# Patient Record
Sex: FEMALE | Race: OTHER | HISPANIC OR LATINO
[De-identification: names, ages, dates, MRNs, and addresses within clinical notes are randomized per-mention and may not be internally consistent; named-entity substitution may affect disease eponyms.]

---

## 2020-02-05 ENCOUNTER — APPOINTMENT (OUTPATIENT)
Dept: UROLOGY | Facility: CLINIC | Age: 40
End: 2020-02-05
Payer: MEDICAID

## 2020-02-05 VITALS
SYSTOLIC BLOOD PRESSURE: 111 MMHG | OXYGEN SATURATION: 100 % | DIASTOLIC BLOOD PRESSURE: 71 MMHG | HEIGHT: 66 IN | HEART RATE: 71 BPM

## 2020-02-05 DIAGNOSIS — R10.2 PELVIC AND PERINEAL PAIN: ICD-10-CM

## 2020-02-05 DIAGNOSIS — Z80.42 FAMILY HISTORY OF MALIGNANT NEOPLASM OF PROSTATE: ICD-10-CM

## 2020-02-05 DIAGNOSIS — R31.29 OTHER MICROSCOPIC HEMATURIA: ICD-10-CM

## 2020-02-05 PROCEDURE — 99204 OFFICE O/P NEW MOD 45 MIN: CPT | Mod: 25

## 2020-02-05 PROCEDURE — 51798 US URINE CAPACITY MEASURE: CPT

## 2020-02-05 NOTE — REVIEW OF SYSTEMS
[Recent Weight Gain (___ Lbs)] : recent [unfilled] ~Ulb weight gain [Dry Eyes] : dryness of the eyes [Vomiting] : vomiting [Urine Infection (bladder/kidney)] : bladder/kidney infection [Pain during urination] : pain during urination [Blood in urine that you can see] : blood visible in urine [Negative] : Heme/Lymph [Fever] : no fever [Feeling Poorly] : not feeling poorly [Chills] : no chills [Abdominal Pain] : no abdominal pain [Constipation] : no constipation [Feeling Tired] : not feeling tired [Diarrhea] : no diarrhea [Heartburn] : no heartburn

## 2020-02-05 NOTE — ASSESSMENT
[FreeTextEntry1] : micorhematuria and blood on tissue with wiping\par will get MARIYA and check urine today \par rtc for CYSTO and vag exam \par ? related to Mirena

## 2020-02-05 NOTE — PHYSICAL EXAM
[General Appearance - Well Developed] : well developed [Normal Appearance] : normal appearance [Well Groomed] : well groomed [General Appearance - Well Nourished] : well nourished [General Appearance - In No Acute Distress] : no acute distress [Edema] : no peripheral edema [Respiration, Rhythm And Depth] : normal respiratory rhythm and effort [Abdomen Tenderness] : non-tender [Exaggerated Use Of Accessory Muscles For Inspiration] : no accessory muscle use [Abdomen Soft] : soft [Abdomen Hernia] : no hernia was discovered [Abdomen Mass (___ Cm)] : no abdominal mass palpated [Costovertebral Angle Tenderness] : no ~M costovertebral angle tenderness [FreeTextEntry1] : pvr 24 ml  [Normal Station and Gait] : the gait and station were normal for the patient's age [] : no rash [Oriented To Time, Place, And Person] : oriented to person, place, and time [No Focal Deficits] : no focal deficits [Not Anxious] : not anxious [Mood] : the mood was normal [Affect] : the affect was normal [Cervical Lymph Nodes Enlarged Anterior Bilaterally] : anterior cervical [Cervical Lymph Nodes Enlarged Posterior Bilaterally] : posterior cervical [Supraclavicular Lymph Nodes Enlarged Bilaterally] : supraclavicular

## 2020-02-05 NOTE — HISTORY OF PRESENT ILLNESS
[FreeTextEntry1] : patient here from [place] one  month\par states had SP pressure/pain and microhematuria when in [place] oneyear and was evaluated and was negative by report\par  ( one vag and one csection ) , was treated with Monoril and had Mirena placed 3 years ago for 5 years\par denies any LUTS or bowel issues\par here for further eval \par non smokere an no hx of renal stones

## 2020-02-07 LAB
APPEARANCE: CLEAR
BACTERIA UR CULT: NORMAL
BACTERIA: NEGATIVE
BILIRUBIN URINE: NEGATIVE
BLOOD URINE: ABNORMAL
COLOR: NORMAL
GLUCOSE QUALITATIVE U: NEGATIVE
HYALINE CASTS: 1 /LPF
KETONES URINE: NEGATIVE
LEUKOCYTE ESTERASE URINE: ABNORMAL
MICROSCOPIC-UA: NORMAL
NITRITE URINE: NEGATIVE
PH URINE: 8
PROTEIN URINE: NEGATIVE
RED BLOOD CELLS URINE: 6 /HPF
SPECIFIC GRAVITY URINE: 1.01
SQUAMOUS EPITHELIAL CELLS: 0 /HPF
UROBILINOGEN URINE: NORMAL
WHITE BLOOD CELLS URINE: 42 /HPF

## 2020-03-02 ENCOUNTER — APPOINTMENT (OUTPATIENT)
Dept: UROLOGY | Facility: CLINIC | Age: 40
End: 2020-03-02

## 2021-05-19 ENCOUNTER — EMERGENCY (EMERGENCY)
Facility: HOSPITAL | Age: 41
LOS: 1 days | Discharge: HOME | End: 2021-05-19
Attending: EMERGENCY MEDICINE
Payer: MEDICAID

## 2021-05-19 VITALS
HEIGHT: 66 IN | OXYGEN SATURATION: 98 % | DIASTOLIC BLOOD PRESSURE: 83 MMHG | SYSTOLIC BLOOD PRESSURE: 131 MMHG | HEART RATE: 85 BPM | RESPIRATION RATE: 17 BRPM | TEMPERATURE: 98 F

## 2021-05-19 LAB
APPEARANCE UR: CLEAR — SIGNIFICANT CHANGE UP
BACTERIA # UR AUTO: ABNORMAL /HPF
BILIRUB UR-MCNC: NEGATIVE — SIGNIFICANT CHANGE UP
COLOR SPEC: YELLOW — SIGNIFICANT CHANGE UP
DIFF PNL FLD: ABNORMAL
EPI CELLS # UR: SIGNIFICANT CHANGE UP /HPF
GLUCOSE UR QL: NEGATIVE — SIGNIFICANT CHANGE UP
KETONES UR-MCNC: ABNORMAL
LEUKOCYTE ESTERASE UR-ACNC: ABNORMAL
NITRITE UR-MCNC: NEGATIVE — SIGNIFICANT CHANGE UP
PH UR: 6 — SIGNIFICANT CHANGE UP (ref 5–8)
PROT UR-MCNC: 100
RBC CASTS # UR COMP ASSIST: >50 /HPF (ref 0–2)
SP GR SPEC: 1.02 — SIGNIFICANT CHANGE UP (ref 1.01–1.02)
UROBILINOGEN FLD QL: NEGATIVE — SIGNIFICANT CHANGE UP
WBC UR QL: ABNORMAL /HPF (ref 0–5)

## 2021-05-19 PROCEDURE — 81001 URINALYSIS AUTO W/SCOPE: CPT

## 2021-05-19 PROCEDURE — 87086 URINE CULTURE/COLONY COUNT: CPT

## 2021-05-19 PROCEDURE — 99284 EMERGENCY DEPT VISIT MOD MDM: CPT

## 2021-05-19 PROCEDURE — 87186 SC STD MICRODIL/AGAR DIL: CPT

## 2021-05-19 PROCEDURE — 99283 EMERGENCY DEPT VISIT LOW MDM: CPT

## 2021-05-19 RX ORDER — CEPHALEXIN 500 MG
1 CAPSULE ORAL
Qty: 21 | Refills: 0
Start: 2021-05-19 | End: 2021-05-25

## 2021-05-19 NOTE — ED PROVIDER NOTE - PATIENT PORTAL LINK FT
You can access the FollowMyHealth Patient Portal offered by [facility] by registering at the following website: [email or web address]. By joining [employer]’s FollowMyHealth portal, you will also be able to view your health information using other applications (apps) compatible with our system.

## 2021-05-19 NOTE — ED PROVIDER NOTE - CPE EDP ENMT NORM
Telephone Encounter by Malgorzata Zamora NCMA at 01/19/18 12:07 PM     Author:  Malgorzata Zamora NCMA Service:  (none) Author Type:  Medical Assistant     Filed:  01/19/18 12:08 PM Encounter Date:  1/19/2018 Status:  Signed     :  Malgorzata Zamora NCMA (Medical Assistant)            RX sent to pharmacy, my chart to patient .[MN1.1M]       Revision History        User Key Date/Time User Provider Type Action    > MN1.1 01/19/18 12:08 PM Malgorzata Zamora NCMA Medical Assistant Sign    M - Manual             normal...

## 2021-05-19 NOTE — ED PROVIDER NOTE - NSFOLLOWUPINSTRUCTIONS_ED_ALL_ED_FT
IMPORTANT INSTRUCTIONS FROM Dr. MCGEE:    Please follow up with your personal medical doctor in 24-48 hours.      If you were advised to take any medications - be sure to review the package insert.    If your symptoms change, get worse or if you have any new symptoms, come to the ER right away.  If you have any questions, call the ER at the phone number on this page.

## 2021-05-19 NOTE — ED PROVIDER NOTE - OBJECTIVE STATEMENT
40 year old male female with PMHx of recent UTIs and no significant PSHx presents to the ED with complaints of burning on urination, now with blood in urine today. Patient denies any fevers, flank pain, vomiting, and all other acute complaints. Patient endorses that she has an appointment scheduled with her GYN for tomorrow, however states that she became concerned regarding the blood in her urine, and thus decided to visit the ED today. Patient reports that she has been tolerating po appropriately. NKDA

## 2021-05-19 NOTE — ED PROVIDER NOTE - CLINICAL SUMMARY MEDICAL DECISION MAKING FREE TEXT BOX
Discussed with patient that she needs to followup with her GYN regarding multiple urine infections for further evaluation. Will treat accordingly. Discussed with patient that she needs to followup with her GYN regarding multiple urine infections for further evaluation. Will treat accordingly. Not suspicious for kidney stone.

## 2022-03-23 ENCOUNTER — EMERGENCY (EMERGENCY)
Facility: HOSPITAL | Age: 42
LOS: 1 days | Discharge: HOME | End: 2022-03-23
Attending: EMERGENCY MEDICINE
Payer: MEDICAID

## 2022-03-23 VITALS
OXYGEN SATURATION: 100 % | TEMPERATURE: 98 F | HEIGHT: 66 IN | WEIGHT: 169.76 LBS | SYSTOLIC BLOOD PRESSURE: 130 MMHG | HEART RATE: 69 BPM | DIASTOLIC BLOOD PRESSURE: 80 MMHG | RESPIRATION RATE: 16 BRPM

## 2022-03-23 PROBLEM — N39.0 URINARY TRACT INFECTION, SITE NOT SPECIFIED: Chronic | Status: ACTIVE | Noted: 2021-05-24

## 2022-03-23 PROCEDURE — 73630 X-RAY EXAM OF FOOT: CPT

## 2022-03-23 PROCEDURE — 99284 EMERGENCY DEPT VISIT MOD MDM: CPT

## 2022-03-23 PROCEDURE — 99283 EMERGENCY DEPT VISIT LOW MDM: CPT | Mod: 25

## 2022-03-23 PROCEDURE — 73630 X-RAY EXAM OF FOOT: CPT | Mod: 26,RT

## 2022-03-23 RX ORDER — IBUPROFEN 200 MG
600 TABLET ORAL ONCE
Refills: 0 | Status: COMPLETED | OUTPATIENT
Start: 2022-03-23 | End: 2022-03-23

## 2022-03-23 RX ADMIN — Medication 600 MILLIGRAM(S): at 12:44

## 2022-03-23 NOTE — ED PROVIDER NOTE - NSFOLLOWUPINSTRUCTIONS_ED_ALL_ED_FT
Seguimiento con el podólogo dentro de los 4 días.    Puede mitchel motrin/tylenol según sea necesario para el dolor.    Si experimenta síntomas nuevos o que empeoran, o si está preocupado, siempre puede regresar a la emergencia para thomas reevaluación.    Fractura de un dedo del pie    LO QUE NECESITA SABER:    ¿Qué es thomas fractura de dedo del pie?Thomas fractura del dedo del pie es thomas rotura en [place] de los huesos del dedo del pie.    Anatomía del pie         ¿Cuáles son los signos y síntomas de thomas fractura en un dedo del pie?  •Dolor, enrojecimiento, inflamación o moretones      •No poder doblar o  el dedo del pie      •Incapacidad para caminar o apoyar peso en el dedo del pie      •El dedo del pie está doblado en un ángulo que no es normal      ¿Cómo se diagnostica thomas fractura en un dedo del pie?Sales médico lo examinará y le hará preguntas sobre la lesión. Es posible que usted necesite los siguientes exámenes:   •Thomas radiografíapodría mostrar la fractura del dedo.      •Thomas resonancia magnéticapuede mostrar thomas fractura de estrés o daño del ligamento. Es posible que le administren un líquido de contraste para que la lesión se galina mejor en las imágenes. Informe a sales médico si alguna vez ha tenido thomas reacción alérgica al medio de contraste. No entre a la luciano donde se realiza la resonancia magnética con algo de metal. El metal puede causar lesiones serias. Informe a sales médico si tiene algún metal dentro o sobre sales cuerpo.      ¿Cómo se trata thomas fractura en un dedo del pie?  •Thomas cinta que cubre dos dedos, un vendaje elástico o thomas férulapueden usarse para apoyar el dedo del pie en sales posición correcta. La cinta que cubre dos dedos se usa para unir el dedo del pie roto y el dedo del pie a sales lado.      •Un dispositivo de apoyo, incluyendo un bastón, muletas, thomas bota para caminar o un zapato de suela dura, puede ser necesario. Estos protegen el dedo del pie y limitan sales movimiento para que pueda sanar.  Bota para caminar           •Los medicamentospodrían administrarse para prevenir o tratar el dolor o thomas infección bacteriana.      •La reducción cerradase utiliza para volver a colocar los huesos en sales lugar sin cirugía.      •La cirugíapuede ser necesaria si el hueso está fuera de lugar o se daña la articulación del dedo del pie. Se pueden utilizar alambres, clavos u otro equipo ortopédico para mantener el hueso en sales lugar mientras camilo.      ¿Cómo puedo controlar los síntomas?  •Descanseel dedo para que pueda sanar. Regrese a joe actividades cotidianas según las indicaciones.      •Aplique hieloen el dedo del pie de 15 a 20 minutos cada hora o jolene se le indique. Use thomas compresa de hielo o ponga hielo triturado en thoams bolsa de plástico. Envuelva el hielo con thomas toalla antes de colocarlo sobre el dedo del pie. El hielo ayuda a evitar daño al tejido y a disminuir la inflamación y el dolor.      •Eleveel dedo por encima del nivel del corazón con la mayor frecuencia posible. [place] va a disminuir inflamación y el dolor. Coloque sales pie sobre almohadas o cobijas para mantenerla elevada y cómodamente.  Elevar la pierna           ¿Cuándo blessing buscar atención inmediata?  •La madhu empapa el vendaje.      •Usted tiene dolor severo en el dedo del pie.      •Sales dedo se encuentra frío o entumecido.      ¿Cuándo blessing llamar a mi médico?  •Tiene fiebre.      •El dolor no desaparece, incluso después del tratamiento.      •Después de que se haya sanado sales dedo, usted todavía siente dolor.      •Usted tiene preguntas o inquietudes acerca de sales condición o cuidado.      ACUERDOS SOBRE SALES CUIDADO:    Usted tiene el derecho de ayudar a planear sales cuidado. Aprenda todo lo que pueda sobre sales condición y jolene darle tratamiento. Discuta joe opciones de tratamiento con joe médicos para decidir el cuidado que usted desea recibir. Usted siempre tiene el derecho de rechazar el tratamiento.

## 2022-03-23 NOTE — ED PROVIDER NOTE - OBJECTIVE STATEMENT
41 y.o female with no PMHx is coming in after stubbing her great right toe in the bathroom yesterday. Patient reports right first toe pain and states she took no medication for her pain. 41 y.o female with no PMHx is coming in after stubbing her great right toe in the bathroom yesterday. Patient reports right first toe pain and states she took no medication for her pain. Denies numbness/tingling.

## 2022-03-23 NOTE — ED PROVIDER NOTE - ATTENDING CONTRIBUTION TO CARE
41yoF prev healthy presents with R big toe pain after stubbing it yesterday. Denies all other trauma and proximal or other toe pain. On exam, afebrile, hemodynamically stable, saturating well, NAD, well appearing, sitting comfortably in chair, no WOB, speaking full sentences, head NCAT, EOMI grossly, breathing comfortably on RA, AAO, CN's 3-12 grossly intact, HA[place] spontaneously, no leg cyanosis or edema, skin warm, well perfused, no rashes or hives, full toe wiggling, mild discoloration to R MTP, nml distal warmth/color/sensation, <2 sec cap refill. Character c/w fx noted on Xray. Neurovascularly intact extrem. Given boot, crutches. Patient is well appearing, NAD, afebrile, hemodynamically stable. Any available tests and studies were discussed with patient. Discharged with instructions in further symptomatic care, return precautions, and need for podiatry f/u.

## 2022-03-23 NOTE — ED PROVIDER NOTE - NS ED ATTENDING STATEMENT MOD
This was a shared visit with the ELIAS. I reviewed and verified the documentation and independently performed the documented:

## 2022-03-23 NOTE — ED ADULT NURSE NOTE - CAS ELECT INFOMATION PROVIDED
crutches instruction given via teach back and demonstration pt ambulated in halllway with steady gait with crutches/DC instructions

## 2022-03-23 NOTE — ED PROVIDER NOTE - NSFOLLOWUPCLINICS_GEN_ALL_ED_FT
[facility]  [facility]  [address] [place] [address]  Phone: [phone]  Fax: [phone]

## 2022-03-23 NOTE — ED PROVIDER NOTE - PATIENT PORTAL LINK FT
You can access the FollowMyHealth Patient Portal offered by [facility] by registering at the following website: [email or web address]. By joining [facility]’s FollowMyHealth portal, you will also be able to view your health information using other applications (apps) compatible with our system.

## 2022-03-29 ENCOUNTER — APPOINTMENT (OUTPATIENT)
Dept: PODIATRY | Facility: CLINIC | Age: 42
End: 2022-03-29

## 2022-03-29 ENCOUNTER — OUTPATIENT (OUTPATIENT)
Dept: OUTPATIENT SERVICES | Facility: HOSPITAL | Age: 42
LOS: 1 days | End: 2022-03-29
Payer: MEDICAID

## 2022-03-29 ENCOUNTER — RESULT REVIEW (OUTPATIENT)
Age: 42
End: 2022-03-29

## 2022-03-29 VITALS
SYSTOLIC BLOOD PRESSURE: 116 MMHG | BODY MASS INDEX: 28.61 KG/M2 | DIASTOLIC BLOOD PRESSURE: 74 MMHG | HEART RATE: 74 BPM | WEIGHT: 178 LBS | OXYGEN SATURATION: 98 % | HEIGHT: 66 IN | RESPIRATION RATE: 18 BRPM | TEMPERATURE: 99.1 F

## 2022-03-29 DIAGNOSIS — Z00.00 ENCOUNTER FOR GENERAL ADULT MEDICAL EXAMINATION WITHOUT ABNORMAL FINDINGS: ICD-10-CM

## 2022-03-29 DIAGNOSIS — S92.919A UNSPECIFIED FRACTURE OF UNSPECIFIED TOE(S), INITIAL ENCOUNTER FOR CLOSED FRACTURE: ICD-10-CM

## 2022-03-29 DIAGNOSIS — S92.911A UNSPECIFIED FRACTURE OF RIGHT TOE(S), INITIAL ENCOUNTER FOR CLOSED FRACTURE: ICD-10-CM

## 2022-03-29 PROCEDURE — G0463: CPT

## 2022-03-29 NOTE — ASSESSMENT
[FreeTextEntry1] : MAR:\par Vasc: DP/PT 2/4 b/l, CFT brisk to digits, TG warm to cool b/l\par Derm: mild ecchymosis noted to medial dorsal aspect of R hallux, no open lesions, no clinical signs of infection\par Neuro: protective sensation grossly intact at level of digits b/l\par MSK: tenderness on palpation to R hallux with limited 1st MPJ ROM \par \par Xray: \par IMPRESSION:\par Minimally displaced intra-articular corner fracture at the base of the proximal phalanx of the hallux\par \par A:\par closed fracture of R hallux PP\par \par P:\par Patient evaluated and chart reviewed\par Discussed xray findings with patient\par Discussed diagnosis and treatment with patient\par Applied dressing to R hallux \par Instructed patient to remain NWB to R foot using crutches, crutches were adjusted for patient \par Repeat xrays ordered to be examined next visit \par Work excuse note provided to patient \par RTC 2 weeks

## 2022-03-29 NOTE — HISTORY OF PRESENT ILLNESS
[FreeTextEntry1] : 42 y/o female with no significant PMHx presents for initial visit s/p R foot injury. Patient states on Wednesday 3/23 she hit her Right foot against a door corner and thought she hurt her nail but presented to ED after persistent pain. Patient notes xrays were taken in ED and she was dispensed a surgical shoe and crutches and instructed to remain NWB to R foot. Patient states she drove into clinic today using her Right foot and noticed worsening pain. Patient has been elevating her foot as much as possible and taking OTC pain meds when needed.

## 2022-03-30 DIAGNOSIS — M79.671 PAIN IN RIGHT FOOT: ICD-10-CM

## 2022-03-30 DIAGNOSIS — S92.411A DISPLACED FRACTURE OF PROXIMAL PHALANX OF RIGHT GREAT TOE, INITIAL ENCOUNTER FOR CLOSED FRACTURE: ICD-10-CM

## 2022-04-12 ENCOUNTER — RESULT REVIEW (OUTPATIENT)
Age: 42
End: 2022-04-12

## 2022-04-12 ENCOUNTER — OUTPATIENT (OUTPATIENT)
Dept: OUTPATIENT SERVICES | Facility: HOSPITAL | Age: 42
LOS: 1 days | End: 2022-04-12
Payer: MEDICAID

## 2022-04-12 ENCOUNTER — APPOINTMENT (OUTPATIENT)
Dept: PODIATRY | Facility: CLINIC | Age: 42
End: 2022-04-12

## 2022-04-12 VITALS
TEMPERATURE: 97.2 F | HEART RATE: 80 BPM | HEIGHT: 66 IN | WEIGHT: 178 LBS | DIASTOLIC BLOOD PRESSURE: 77 MMHG | BODY MASS INDEX: 28.61 KG/M2 | RESPIRATION RATE: 18 BRPM | SYSTOLIC BLOOD PRESSURE: 114 MMHG | OXYGEN SATURATION: 98 %

## 2022-04-12 DIAGNOSIS — S92.411S: ICD-10-CM

## 2022-04-12 DIAGNOSIS — S92.919A UNSPECIFIED FRACTURE OF UNSPECIFIED TOE(S), INITIAL ENCOUNTER FOR CLOSED FRACTURE: ICD-10-CM

## 2022-04-12 DIAGNOSIS — M79.671 PAIN IN RIGHT FOOT: ICD-10-CM

## 2022-04-12 DIAGNOSIS — Z00.00 ENCOUNTER FOR GENERAL ADULT MEDICAL EXAMINATION WITHOUT ABNORMAL FINDINGS: ICD-10-CM

## 2022-04-12 PROCEDURE — G0463: CPT

## 2022-04-12 PROCEDURE — 73630 X-RAY EXAM OF FOOT: CPT | Mod: 26,RT

## 2022-04-12 PROCEDURE — 73630 X-RAY EXAM OF FOOT: CPT

## 2022-04-12 NOTE — HISTORY OF PRESENT ILLNESS
[FreeTextEntry1] : 40 y/o female with no significant PMHx RTC s/p R foot injury. Patient states on Wednesday 3/23 she hit her Right foot against a door corner and thought she hurt her nail but presented to ED after persistent pain. Patient notes xrays were taken in ED and she was dispensed a surgical shoe and crutches and instructed to remain NWB to R foot. Patient notes she has been compliant with instructions given last visit to stay NWB to Right foot, she has been elevating her foot as well. Notes improved pain. Relates she took new xrays today

## 2022-04-12 NOTE — ASSESSMENT
[FreeTextEntry1] : MAR:\par Vasc: DP/PT 2/4 b/l, CFT brisk to digits, TG warm to cool b/l\par Derm: improved ecchymosis noted on medial dorsal aspect of R hallux, no open lesions, no clinical signs of infection\par Neuro: protective sensation grossly intact at level of digits b/l\par MSK: mild tenderness on palpation to R hallux with limited 1st MPJ ROM \par \par \par A:\par closed fracture of of medial R hallux PP\par \par P:\par Patient evaluated and chart reviewed\par New xrays evaluated and discussed with patient, callus formation noted \par Applied dressing to R hallux \par Instructed patient to remain NWB to R foot using crutches\par Repeat xrays ordered to be examined next visit \par RTC 3 weeks

## 2022-05-10 ENCOUNTER — OUTPATIENT (OUTPATIENT)
Dept: OUTPATIENT SERVICES | Facility: HOSPITAL | Age: 42
LOS: 1 days | End: 2022-05-10
Payer: MEDICAID

## 2022-05-10 ENCOUNTER — RESULT REVIEW (OUTPATIENT)
Age: 42
End: 2022-05-10

## 2022-05-10 ENCOUNTER — APPOINTMENT (OUTPATIENT)
Dept: PODIATRY | Facility: CLINIC | Age: 42
End: 2022-05-10

## 2022-05-10 VITALS
TEMPERATURE: 97.3 F | WEIGHT: 178 LBS | OXYGEN SATURATION: 99 % | BODY MASS INDEX: 28.61 KG/M2 | RESPIRATION RATE: 18 BRPM | HEIGHT: 66 IN | HEART RATE: 71 BPM | DIASTOLIC BLOOD PRESSURE: 73 MMHG | SYSTOLIC BLOOD PRESSURE: 114 MMHG

## 2022-05-10 DIAGNOSIS — Z00.00 ENCOUNTER FOR GENERAL ADULT MEDICAL EXAMINATION WITHOUT ABNORMAL FINDINGS: ICD-10-CM
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
## 2022-05-10 DIAGNOSIS — S92.911A UNSPECIFIED FRACTURE OF RIGHT TOE(S), INITIAL ENCOUNTER FOR CLOSED FRACTURE: ICD-10-CM

## 2022-05-10 PROCEDURE — G0463: CPT

## 2022-05-10 PROCEDURE — 73630 X-RAY EXAM OF FOOT: CPT

## 2022-05-10 PROCEDURE — 73630 X-RAY EXAM OF FOOT: CPT | Mod: 26,RT

## 2022-05-10 NOTE — HISTORY OF PRESENT ILLNESS
[FreeTextEntry1] : 42 y/o female with no significant PMHx RTC s/p R foot injury. Patient states on Wednesday 3/23 she hit her Right foot against a door corner and thought she hurt her nail. Patient has been heel WB for past 3 weeks with surgical shoe and crutches for support. she states she still has pain, is taking aleve. she states she is not icing or elevating her foot. Relates she took new xrays today. denies n/v/sob/cp/f/c

## 2022-05-10 NOTE — ASSESSMENT
[FreeTextEntry1] : MAR:\par Vasc: DP/PT 2/4 b/l, CFT brisk to digits, TG warm to cool b/l\par Derm: no more ecchymosis noted on medial dorsal aspect of R hallux, no open lesions, no clinical signs of infection\par Neuro: protective sensation grossly intact at level of digits b/l\par MSK: mild tenderness on palpation to R hallux with limited 1st MPJ ROM \par \par \par A:\par closed fracture of of medial R hallux PP\par \par P:\par Patient evaluated and chart reviewed\par New xrays evaluated and discussed with patient, callus formation noted with progression of fracture healing however still not fully healed \par Applied ace bandage \par rice protocol \par Instructed patient to remain heel WB to RIght foot \par Repeat xrays ordered to be examined next visit \par discussed possibility of bone stimulator for healing to continue discussion next visit \par RTC 2 weeks

## 2022-05-11 DIAGNOSIS — S92.414A NONDISPLACED FRACTURE OF PROXIMAL PHALANX OF RIGHT GREAT TOE, INITIAL ENCOUNTER FOR CLOSED FRACTURE: ICD-10-CM

## 2022-05-11 DIAGNOSIS — M79.674 PAIN IN RIGHT TOE(S): ICD-10-CM

## 2022-05-26 ENCOUNTER — APPOINTMENT (OUTPATIENT)
Dept: PODIATRY | Facility: CLINIC | Age: 42
End: 2022-05-26

## 2022-05-26 ENCOUNTER — OUTPATIENT (OUTPATIENT)
Dept: OUTPATIENT SERVICES | Facility: HOSPITAL | Age: 42
LOS: 1 days | End: 2022-05-26
Payer: MEDICAID

## 2022-05-26 VITALS
BODY MASS INDEX: 28.61 KG/M2 | HEIGHT: 66 IN | DIASTOLIC BLOOD PRESSURE: 75 MMHG | WEIGHT: 178 LBS | HEART RATE: 75 BPM | SYSTOLIC BLOOD PRESSURE: 112 MMHG | RESPIRATION RATE: 18 BRPM | OXYGEN SATURATION: 96 % | TEMPERATURE: 97 F

## 2022-05-26 DIAGNOSIS — S92.911A UNSPECIFIED FRACTURE OF RIGHT TOE(S), INITIAL ENCOUNTER FOR CLOSED FRACTURE: ICD-10-CM

## 2022-05-26 DIAGNOSIS — Z00.00 ENCOUNTER FOR GENERAL ADULT MEDICAL EXAMINATION WITHOUT ABNORMAL FINDINGS: ICD-10-CM

## 2022-05-26 PROCEDURE — 73630 X-RAY EXAM OF FOOT: CPT | Mod: 26,RT

## 2022-05-26 PROCEDURE — 73630 X-RAY EXAM OF FOOT: CPT

## 2022-05-26 PROCEDURE — G0463: CPT

## 2022-05-26 NOTE — ASSESSMENT
[FreeTextEntry1] : MAR:\par Vasc: DP/PT 2/4 b/l, CFT brisk to digits, TG warm to cool b/l, minimal edema \par Derm: no more ecchymosis noted on medial dorsal aspect of R hallux, no open lesions, no clinical signs of infection\par Neuro: protective sensation grossly intact at level of digits b/l\par MSK: mild tenderness on palpation to R hallux with limited 1st MPJ ROM, pain with plantarflex of hallux \par \par \par A:\par closed fracture of of medial R hallux PP\par \par P:\par Patient evaluated and chart reviewed\par New xrays evaluated and discussed with patient, callus formation noted with progression of fracture healing Applied ace bandage \par continue RICE therapy \par NSAID prn  \par Instructed patient to transition to WBAT in supportive tennis shoe\par All questions addressed \par RTC 2 week for reevaluation of return to work. \par

## 2022-05-26 NOTE — HISTORY OF PRESENT ILLNESS
[FreeTextEntry1] : 42 y/o female with no significant PMHx RTC s/p R foot injury. Last seen 2 weeks ago, new xrays this week with increased consolidation at fracture site. Patient states on Wednesday 3/23 she hit her Right foot against a door corner and thought she hurt her nail. Patient has been heel WB for past 3 weeks with surgical shoe and crutches for support. she states she still has some pain, continues to take aleeve for pain. she states she is not icing or elevating her foot. 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

## 2022-06-08 DIAGNOSIS — S92.919D: ICD-10-CM

## 2022-06-09 ENCOUNTER — RESULT REVIEW (OUTPATIENT)
Age: 42
End: 2022-06-09

## 2022-06-09 ENCOUNTER — OUTPATIENT (OUTPATIENT)
Dept: OUTPATIENT SERVICES | Facility: HOSPITAL | Age: 42
LOS: 1 days | End: 2022-06-09
Payer: MEDICAID

## 2022-06-09 ENCOUNTER — APPOINTMENT (OUTPATIENT)
Dept: PODIATRY | Facility: CLINIC | Age: 42
End: 2022-06-09

## 2022-06-09 VITALS
DIASTOLIC BLOOD PRESSURE: 69 MMHG | BODY MASS INDEX: 28.61 KG/M2 | TEMPERATURE: 97.4 F | HEIGHT: 66 IN | SYSTOLIC BLOOD PRESSURE: 104 MMHG | HEART RATE: 89 BPM | WEIGHT: 178 LBS | RESPIRATION RATE: 18 BRPM

## 2022-06-09 DIAGNOSIS — Z00.00 ENCOUNTER FOR GENERAL ADULT MEDICAL EXAMINATION WITHOUT ABNORMAL FINDINGS: ICD-10-CM

## 2022-06-09 PROCEDURE — G0463: CPT

## 2022-06-13 DIAGNOSIS — S92.414A NONDISPLACED FRACTURE OF PROXIMAL PHALANX OF RIGHT GREAT TOE, INITIAL ENCOUNTER FOR CLOSED FRACTURE: ICD-10-CM

## 2022-06-13 DIAGNOSIS — M79.674 PAIN IN RIGHT TOE(S): ICD-10-CM

## 2022-06-13 NOTE — ED ADULT TRIAGE NOTE - ARRIVAL FROM
Home
My signature below certifies that the above stated patient is homebound and upon completion of the Face-To-Face encounter, has the need for intermittent skilled nursing, physical therapy and/or speech or occupational therapy services in their home for their current diagnosis as outlined in their initial plan of care. These services will continue to be monitored by myself or another physician.

## 2022-06-16 NOTE — HISTORY OF PRESENT ILLNESS
[FreeTextEntry1] : 42 y/o female with no significant PMHx RTC s/p R foot injury. spoke with patient via . Last  xrays shows  increased consolidation at fracture site. Patient states on Wednesday 3/23 she hit her Right foot against a door corner and thought she hurt her nail. Patient has been heel WBAT for past 2 weeks. Pt admitted  sharp pain on her right hallux,rates her pain 5/10 on VAS.Pt has been taking Aleve for pain. she states she is icing or elevating her foot. 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

## 2022-06-16 NOTE — ASSESSMENT
[FreeTextEntry1] : MAR:\par Vasc: DP/PT 2/4 b/l, CFT brisk to digits, TG warm to cool b/l, minimal edema \par Derm: no more ecchymosis noted on medial dorsal aspect of R hallux, no open lesions, no clinical signs of infection\par Neuro: protective sensation grossly intact at level of digits b/l\par MSK: mild tenderness on palpation to R hallux with limited 1st MPJ ROM, pain with plantarflex of hallux \par \par \par A:\par closed fracture of of medial R hallux PP\par \par P:\par Patient evaluated and chart reviewed\par Ordered R foot new x-ray for next visit\par Recommended CAM boot right foot,patient talked to [facility] to receive her boot 6/10/22 at office\par continue RICE therapy\par continue Aleve prn  \par All questions addressed \par RTC 2 week for reevaluation \par

## 2022-06-23 ENCOUNTER — RESULT REVIEW (OUTPATIENT)
Age: 42
End: 2022-06-23

## 2022-06-23 ENCOUNTER — APPOINTMENT (OUTPATIENT)
Dept: PODIATRY | Facility: CLINIC | Age: 42
End: 2022-06-23

## 2022-06-23 ENCOUNTER — OUTPATIENT (OUTPATIENT)
Dept: OUTPATIENT SERVICES | Facility: HOSPITAL | Age: 42
LOS: 1 days | End: 2022-06-23
Payer: MEDICAID

## 2022-06-23 VITALS
SYSTOLIC BLOOD PRESSURE: 111 MMHG | RESPIRATION RATE: 18 BRPM | HEIGHT: 66 IN | WEIGHT: 178 LBS | HEART RATE: 69 BPM | DIASTOLIC BLOOD PRESSURE: 73 MMHG | BODY MASS INDEX: 28.61 KG/M2 | TEMPERATURE: 97.4 F

## 2022-06-23 DIAGNOSIS — S92.919A UNSPECIFIED FRACTURE OF UNSPECIFIED TOE(S), INITIAL ENCOUNTER FOR CLOSED FRACTURE: ICD-10-CM

## 2022-06-23 DIAGNOSIS — Z00.00 ENCOUNTER FOR GENERAL ADULT MEDICAL EXAMINATION WITHOUT ABNORMAL FINDINGS: ICD-10-CM

## 2022-06-23 PROCEDURE — 73630 X-RAY EXAM OF FOOT: CPT

## 2022-06-23 PROCEDURE — G0463: CPT

## 2022-06-23 PROCEDURE — 73630 X-RAY EXAM OF FOOT: CPT | Mod: 26,RT

## 2022-06-23 NOTE — HISTORY OF PRESENT ILLNESS
[FreeTextEntry1] : 40 y/o female with no significant PMHx RTC s/p R foot injury. new xrays done today. patient states on Wednesday 3/23 she hit her Right foot against a door corner and thought she hurt her nail. Patient has been heel WBAT for past 2 weeks. Pt admitted  intermittent pain on her right hallux,rates her pain 3/10 on VAS. She has been using a cam boot for 2 weeks. 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

## 2022-06-23 NOTE — ASSESSMENT
[FreeTextEntry1] : MAR:\par Vasc: DP/PT 2/4 b/l, CFT brisk to digits, TG warm to cool b/l, minimal edema \par Derm: no more ecchymosis noted on medial dorsal aspect of R hallux, no open lesions, no clinical signs of infection\par Neuro: protective sensation grossly intact at level of digits b/l\par MSK: some tenderness on palpation to R hallux with limited 1st MPJ ROM, no pain with plantarflex of hallux \par \par \par A:\par closed fracture of of medial R hallux PP\par \par P:\par Patient evaluated and chart reviewed\par reviewed new xrays \par Ordered R foot new x-ray for next visit\par may transition to supportive sneakers, WB\par use cam boot as needed \par All questions addressed \par RTC 3 weeks\par

## 2022-06-24 DIAGNOSIS — M79.671 PAIN IN RIGHT FOOT: ICD-10-CM

## 2022-07-14 ENCOUNTER — OUTPATIENT (OUTPATIENT)
Dept: OUTPATIENT SERVICES | Facility: HOSPITAL | Age: 42
LOS: 1 days | End: 2022-07-14
Payer: MEDICAID

## 2022-07-14 ENCOUNTER — APPOINTMENT (OUTPATIENT)
Dept: PODIATRY | Facility: CLINIC | Age: 42
End: 2022-07-14

## 2022-07-14 ENCOUNTER — NON-APPOINTMENT (OUTPATIENT)
Age: 42
End: 2022-07-14

## 2022-07-14 VITALS
TEMPERATURE: 98.7 F | WEIGHT: 174 LBS | DIASTOLIC BLOOD PRESSURE: 74 MMHG | HEART RATE: 74 BPM | OXYGEN SATURATION: 98 % | BODY MASS INDEX: 27.97 KG/M2 | RESPIRATION RATE: 18 BRPM | HEIGHT: 66 IN | SYSTOLIC BLOOD PRESSURE: 111 MMHG

## 2022-07-14 DIAGNOSIS — Z00.00 ENCOUNTER FOR GENERAL ADULT MEDICAL EXAMINATION WITHOUT ABNORMAL FINDINGS: ICD-10-CM

## 2022-07-14 DIAGNOSIS — S92.911A UNSPECIFIED FRACTURE OF RIGHT TOE(S), INITIAL ENCOUNTER FOR CLOSED FRACTURE: ICD-10-CM

## 2022-07-14 PROCEDURE — G0463: CPT

## 2022-07-14 PROCEDURE — 73630 X-RAY EXAM OF FOOT: CPT | Mod: 26,RT

## 2022-07-14 PROCEDURE — 73630 X-RAY EXAM OF FOOT: CPT

## 2022-07-15 ENCOUNTER — NON-APPOINTMENT (OUTPATIENT)
Age: 42
End: 2022-07-15

## 2022-07-15 DIAGNOSIS — S92.911D: ICD-10-CM

## 2022-07-15 DIAGNOSIS — S92.919D: ICD-10-CM

## 2022-07-16 NOTE — HISTORY OF PRESENT ILLNESS
[Sneakers] : gretel [FreeTextEntry1] : 40 y/o female with no significant PMHx RTC s/p R foot injury. new xrays done today prior to clinic. patient states on Wednesday 3/23 she hit her Right foot against a door corner and thought she hurt her nail. Pt admitted intermittent pain on her right hallux upon palpation or wearing sneakers,rates her pain 4/10 right now. Pt states she takes alleve when there is a lot of pain or when there is swelling. She states she has disability and is still off work. \par  She has been wearing sneakers past 2 weeks and was using wearing CAM walker prior to that. 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

## 2022-07-16 NOTE — ASSESSMENT
[FreeTextEntry1] : MAR:\par Vasc: DP/PT 2/4 b/l, CFT brisk to digits, TG warm to cool b/l, minimal edema \par Derm: no more ecchymosis noted on medial dorsal aspect of R hallux, no open lesions, no clinical signs of infection\par Neuro: protective sensation grossly intact at level of digits b/l\par MSK: some tenderness on palpation to R hallux with limited 1st MPJ ROM, no pain with plantarflex of hallux. Pain with dorsiflexion\par \par \par A:\par closed fracture of of medial R hallux PP\par \par P:\par Patient evaluated and chart reviewed\par reviewed new xrays with patient, described bony healing \par Ordered R foot Ct for next visit in 3 weeks\par contd wb with sneakers\par use cam boot as needed \par All questions addressed \par RTC 3 weeks\par \par  number: [id number]

## 2022-07-26 ENCOUNTER — APPOINTMENT (OUTPATIENT)
Dept: CT IMAGING | Facility: HOSPITAL | Age: 42
End: 2022-07-26

## 2022-07-26 ENCOUNTER — OUTPATIENT (OUTPATIENT)
Dept: OUTPATIENT SERVICES | Facility: HOSPITAL | Age: 42
LOS: 1 days | End: 2022-07-26
Payer: MEDICAID

## 2022-07-26 ENCOUNTER — RESULT REVIEW (OUTPATIENT)
Age: 42
End: 2022-07-26

## 2022-07-26 DIAGNOSIS — S92.919A UNSPECIFIED FRACTURE OF UNSPECIFIED TOE(S), INITIAL ENCOUNTER FOR CLOSED FRACTURE: ICD-10-CM

## 2022-07-26 LAB — HCG UR QL: NEGATIVE — SIGNIFICANT CHANGE UP

## 2022-07-26 PROCEDURE — 81025 URINE PREGNANCY TEST: CPT

## 2022-07-26 PROCEDURE — 76376 3D RENDER W/INTRP POSTPROCES: CPT | Mod: 26

## 2022-07-26 PROCEDURE — 73700 CT LOWER EXTREMITY W/O DYE: CPT | Mod: 26,RT

## 2022-07-26 PROCEDURE — 76376 3D RENDER W/INTRP POSTPROCES: CPT

## 2022-07-26 PROCEDURE — 73700 CT LOWER EXTREMITY W/O DYE: CPT

## 2022-08-04 ENCOUNTER — OUTPATIENT (OUTPATIENT)
Dept: OUTPATIENT SERVICES | Facility: HOSPITAL | Age: 42
LOS: 1 days | End: 2022-08-04
Payer: MEDICAID

## 2022-08-04 ENCOUNTER — APPOINTMENT (OUTPATIENT)
Dept: PODIATRY | Facility: CLINIC | Age: 42
End: 2022-08-04

## 2022-08-04 VITALS
RESPIRATION RATE: 18 BRPM | HEIGHT: 66 IN | HEART RATE: 72 BPM | DIASTOLIC BLOOD PRESSURE: 64 MMHG | TEMPERATURE: 97 F | WEIGHT: 174 LBS | BODY MASS INDEX: 27.97 KG/M2 | SYSTOLIC BLOOD PRESSURE: 96 MMHG | OXYGEN SATURATION: 99 %

## 2022-08-04 DIAGNOSIS — Z00.00 ENCOUNTER FOR GENERAL ADULT MEDICAL EXAMINATION WITHOUT ABNORMAL FINDINGS: ICD-10-CM

## 2022-08-04 PROCEDURE — G0463: CPT

## 2022-08-04 NOTE — ASSESSMENT
[FreeTextEntry1] : MAR:\par Vasc: DP/PT 2/4 b/l, CFT brisk to digits, TG warm to cool b/l, minimal edema \par Derm: no more ecchymosis noted on medial dorsal aspect of R hallux, no open lesions, no clinical signs of infection\par Neuro: protective sensation grossly intact at level of digits b/l\par MSK: some tenderness on palpation to R hallux with limited 1st MPJ ROM, no pain with plantarflex of hallux. Pain with dorsiflexion\par \par \par A:\par closed fracture of of medial R hallux PP healing \par \par P:\par Patient evaluated and chart reviewed\par reviewed r foot CT which shows chronic healing fx \par explained to patient that pain may be more arthritic than from fracture \par discussed mortons extension to help with limiting motion of mpj to relieve pain, discussed with [facility]. \par contd wbat with sneakers\par use cam boot as needed \par All questions addressed \par RTC when orthotic casting is ready \par \par  number: [id number]

## 2022-08-04 NOTE — HISTORY OF PRESENT ILLNESS
[Sneakers] : gretel [FreeTextEntry1] : 42 y/o female with no significant PMHx RTC s/p R foot injury. Today patient states pain is still a 4/10, she is walking in a sneaker as recommended. Patient has new CT done of right foot. Denies n/v/sob/cp/f/c\par \par of previous note, patient states on Wednesday 3/23 she hit her Right foot against a door corner and thought she hurt her nail. \par  She has been wearing sneakers past 2 weeks and was using wearing CAM walker prior to that. Works as home attendant.

## 2022-08-05 DIAGNOSIS — M79.671 PAIN IN RIGHT FOOT: ICD-10-CM

## 2022-08-05 DIAGNOSIS — S92.491D OTHER FRACTURE OF RIGHT GREAT TOE, SUBSEQUENT ENCOUNTER FOR FRACTURE WITH ROUTINE HEALING: ICD-10-CM

## 2022-09-01 ENCOUNTER — APPOINTMENT (OUTPATIENT)
Dept: PODIATRY | Facility: CLINIC | Age: 42
End: 2022-09-01

## 2022-09-08 ENCOUNTER — OUTPATIENT (OUTPATIENT)
Dept: OUTPATIENT SERVICES | Facility: HOSPITAL | Age: 42
LOS: 1 days | End: 2022-09-08
Payer: MEDICAID

## 2022-09-08 ENCOUNTER — APPOINTMENT (OUTPATIENT)
Dept: PODIATRY | Facility: CLINIC | Age: 42
End: 2022-09-08

## 2022-09-08 VITALS
OXYGEN SATURATION: 99 % | TEMPERATURE: 97.4 F | HEART RATE: 70 BPM | RESPIRATION RATE: 18 BRPM | SYSTOLIC BLOOD PRESSURE: 137 MMHG | HEIGHT: 66 IN | DIASTOLIC BLOOD PRESSURE: 82 MMHG | WEIGHT: 174 LBS | BODY MASS INDEX: 27.97 KG/M2

## 2022-09-08 DIAGNOSIS — Z00.00 ENCOUNTER FOR GENERAL ADULT MEDICAL EXAMINATION WITHOUT ABNORMAL FINDINGS: ICD-10-CM

## 2022-09-08 PROCEDURE — G0463: CPT

## 2022-09-13 DIAGNOSIS — M79.671 PAIN IN RIGHT FOOT: ICD-10-CM

## 2022-09-13 DIAGNOSIS — S92.491D OTHER FRACTURE OF RIGHT GREAT TOE, SUBSEQUENT ENCOUNTER FOR FRACTURE WITH ROUTINE HEALING: ICD-10-CM

## 2022-09-13 NOTE — ASSESSMENT
[FreeTextEntry1] : MAR:\par Vasc: DP/PT 2/4 b/l, CFT brisk to digits, TG warm to cool b/l, minimal edema \par Derm: no more ecchymosis noted on medial dorsal aspect of R hallux, no open lesions, no clinical signs of infection\par Neuro: protective sensation grossly intact at level of digits b/l\par MSK: some tenderness on palpation to R hallux with limited 1st MPJ ROM, no pain with plantarflex of hallux. Pain with dorsiflexion\par \par \par A:\par closed fracture of of medial R hallux PP healing \par \par P:\par Patient evaluated and chart reviewed\par reviewed r foot CT which shows chronic healing fx \par explained to patient that pain may be more arthritic than from fracture \par discussed mortons extension to help with limiting motion of mpj to relieve pain, discussed with [facility]. \par contd wbat with sneakers\par Patient seen by [facility]\par use cam boot as needed \par All questions addressed \par RTC after receiving orthotics

## 2022-09-13 NOTE — HISTORY OF PRESENT ILLNESS
[Sneakers] : gretel [FreeTextEntry1] : 42 y/o female with no significant PMHx RTC s/p R foot injury. Today patient states pain is same as last visit, and that it is preventing her from walking long distances or working for long periods of time. Denies n/v/sob/cp/f/c\par She has been wearing sneakers and wearing CAM walker prior to that. Works as home attendant.

## 2022-10-12 ENCOUNTER — APPOINTMENT (OUTPATIENT)
Dept: PODIATRY | Facility: CLINIC | Age: 42
End: 2022-10-12

## 2022-10-12 ENCOUNTER — OUTPATIENT (OUTPATIENT)
Dept: OUTPATIENT SERVICES | Facility: HOSPITAL | Age: 42
LOS: 1 days | End: 2022-10-12
Payer: MEDICAID

## 2022-10-12 VITALS
SYSTOLIC BLOOD PRESSURE: 115 MMHG | WEIGHT: 174 LBS | TEMPERATURE: 97 F | BODY MASS INDEX: 27.97 KG/M2 | RESPIRATION RATE: 18 BRPM | OXYGEN SATURATION: 99 % | HEIGHT: 66 IN | HEART RATE: 76 BPM | DIASTOLIC BLOOD PRESSURE: 79 MMHG

## 2022-10-12 DIAGNOSIS — Z00.00 ENCOUNTER FOR GENERAL ADULT MEDICAL EXAMINATION WITHOUT ABNORMAL FINDINGS: ICD-10-CM

## 2022-10-12 PROCEDURE — G0463: CPT

## 2022-10-12 NOTE — HISTORY OF PRESENT ILLNESS
[Sneakers] : gretel [FreeTextEntry1] : 40 y/o female with no significant PMHx RTC s/p R foot injury. Today patient states pain is same as last visit, and that it is preventing her from walking long distances or working for long periods of time. Denies n/v/sob/cp/f/cShe has been wearing sneakers and wearing CAM walker prior to that. Works as home attendant.

## 2022-10-13 DIAGNOSIS — M79.671 PAIN IN RIGHT FOOT: ICD-10-CM

## 2022-10-13 DIAGNOSIS — S92.414D NONDISPLACED FRACTURE OF PROXIMAL PHALANX OF RIGHT GREAT TOE, SUBSEQUENT ENCOUNTER FOR FRACTURE WITH ROUTINE HEALING: ICD-10-CM

## 2022-10-26 ENCOUNTER — APPOINTMENT (OUTPATIENT)
Dept: PODIATRY | Facility: CLINIC | Age: 42
End: 2022-10-26

## 2023-02-17 ENCOUNTER — EMERGENCY (EMERGENCY)
Facility: HOSPITAL | Age: 43
LOS: 1 days | Discharge: HOME | End: 2023-02-17
Attending: EMERGENCY MEDICINE
Payer: MEDICAID

## 2023-02-17 VITALS
SYSTOLIC BLOOD PRESSURE: 129 MMHG | OXYGEN SATURATION: 99 % | WEIGHT: 179.02 LBS | HEIGHT: 66 IN | HEART RATE: 83 BPM | DIASTOLIC BLOOD PRESSURE: 83 MMHG | TEMPERATURE: 99 F | RESPIRATION RATE: 18 BRPM

## 2023-02-17 PROCEDURE — 99284 EMERGENCY DEPT VISIT MOD MDM: CPT

## 2023-02-17 PROCEDURE — 99283 EMERGENCY DEPT VISIT LOW MDM: CPT

## 2023-02-17 RX ORDER — DEXTROMETHORPHAN HYDROBROMIDE AND PROMETHAZINE HYDROCHLORIDE 15; 6.25 MG/5ML; MG/5ML
5 SYRUP ORAL
Qty: 100 | Refills: 0
Start: 2023-02-17 | End: 2023-02-21

## 2023-02-17 RX ORDER — IPRATROPIUM BROMIDE 21 MCG
2 AEROSOL, SPRAY (ML) NASAL
Qty: 1 | Refills: 0
Start: 2023-02-17 | End: 2023-02-20

## 2023-02-17 RX ORDER — DEXAMETHASONE 0.5 MG/5ML
10 ELIXIR ORAL ONCE
Refills: 0 | Status: COMPLETED | OUTPATIENT
Start: 2023-02-17 | End: 2023-02-17

## 2023-02-17 RX ADMIN — Medication 10 MILLIGRAM(S): at 18:46

## 2023-02-17 NOTE — ED PROVIDER NOTE - ATTENDING APP SHARED VISIT CONTRIBUTION OF CARE
42-year-old female no past medical history complains of chronic cough x1 month and thought to have allergies per PCP.  Also with 2 days of right ear pain.  Chronic cough likely from allergies.  No signs of infection.  DC with supportive care and outpatient follow-up

## 2023-02-17 NOTE — ED PROVIDER NOTE - OBJECTIVE STATEMENT
#[id number]: 42-year-old female with no past medical history presents with cough and itchy throat for 1 month.  She saw her primary care doctor who told her she had allergies and gave her loratadine.  She is due to see an allergist on February 28.  Patient reports that the cough is too much and now for the last 2 days her right side of her throat and her right ear has been hurting her.  Denies shortness of breath, difficulty swallowing, fevers, chest pain

## 2023-02-17 NOTE — ED PROVIDER NOTE - CLINICAL SUMMARY MEDICAL DECISION MAKING FREE TEXT BOX
42 year old female with cough x 1 month, sore throat and right ear pain x 2 days. Well appearing on exam. Will give a 1x dose of decadron and sent medications to pharmacy for symptoms.

## 2023-02-17 NOTE — ED PROVIDER NOTE - PATIENT PORTAL LINK FT
You can access the FollowMyHealth Patient Portal offered by [facility] by registering at the following website: [email or web address]. By joining [employer]’s FollowMyHealth portal, you will also be able to view your health information using other applications (apps) compatible with our system.

## 2023-02-17 NOTE — ED PROVIDER NOTE - NSFOLLOWUPINSTRUCTIONS_ED_ALL_ED_FT
El esteroide que le dieron en la luciano de emergencias seguirá funcionando arlette las próximas 72 horas.    [place] motrin y/o tylenol según sea necesario para la fiebre o el dolor    Medicamentos enviados a la farmacia por usted. Mitchel según las indicaciones. A veces, el medicamento para la tos no está cubierto por el seguro, si milton es el alfredo, puede pagarlo de sales bolsillo o mitchel medicamentos de venta lennox para el resfriado jolene DayQuil o NyQuil severo para tratarlo.    Para el dolor de garganta puede utilizar Pastillas de Cepacol.    Si experimenta síntomas nuevos o que empeoran, jolene dolor en el pecho o dificultad para respirar, o si está preocupado, siempre puede regresar a la emergencia para thomas reevaluación.    The steroid given to you in the emergency room will continue to work over the next 72 hours.     Take motrin and/or tylenol as needed for fever or pain     Medications sent to the pharmacy for you. Take as directed. Sometimes the cough medicine is not covered by insurance, if that is the case you can either pay out of pocket for it or take over the counter cold medications such as DayQuil or NyQuil severe to treat it.    For sore throat you can use Cepacol Lozenges.     If you experience any new or worsening symptoms such as chest pain or difficulty breathing or if you are concerned you can always come back to the emergency for a re-evaluation.

## 2024-01-02 NOTE — ED ADULT TRIAGE NOTE - MEANS OF ARRIVAL
butalbital-acetaminophen-caffeine (ESGIC) -40 MG tablet.  Patient requesting refill, rx'd by Jessi Lua.   [facility], [place].     ambulatory

## 2024-09-07 ENCOUNTER — NON-APPOINTMENT (OUTPATIENT)
Age: 44
End: 2024-09-07

## 2025-06-04 NOTE — ED ADULT TRIAGE NOTE - ESI TRIAGE ACUITY LEVEL, MLM
[facility] has not received any communication from pt since UTR letter sent. Case closed at this time. [facility] team is available should pt reach out for assistance or if pt is referred again in the future.   3